# Patient Record
Sex: MALE | URBAN - METROPOLITAN AREA
[De-identification: names, ages, dates, MRNs, and addresses within clinical notes are randomized per-mention and may not be internally consistent; named-entity substitution may affect disease eponyms.]

---

## 2019-08-05 ENCOUNTER — OFFICE VISIT (OUTPATIENT)
Dept: URBAN - METROPOLITAN AREA CLINIC 32 | Facility: CLINIC | Age: 34
End: 2019-08-05
Payer: COMMERCIAL

## 2019-08-05 DIAGNOSIS — H53.40 UNSPECIFIED VISUAL FIELD DEFECTS: Primary | ICD-10-CM

## 2019-08-05 PROCEDURE — 92004 COMPRE OPH EXAM NEW PT 1/>: CPT | Performed by: OPHTHALMOLOGY

## 2019-08-05 ASSESSMENT — INTRAOCULAR PRESSURE
OD: 9
OS: 11

## 2019-08-07 NOTE — IMPRESSION/PLAN
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  Plan: IT VF defect OS -- normal intraocular exam -- will send him for MRI Brain and Orbit with and without contrast -- follow up after results in

## 2019-08-12 ENCOUNTER — OFFICE VISIT (OUTPATIENT)
Dept: URBAN - METROPOLITAN AREA CLINIC 32 | Facility: CLINIC | Age: 34
End: 2019-08-12
Payer: COMMERCIAL

## 2019-08-12 PROCEDURE — 92012 INTRM OPH EXAM EST PATIENT: CPT | Performed by: OPHTHALMOLOGY

## 2019-08-12 NOTE — IMPRESSION/PLAN
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  Plan: Repeat HVF confirms OS VF defect (IT starting from the blind spot, questionable new defect nasal) -- MRI, chem panel, CBC wnl -- now MRA ordered by neurologist -- will try to refer to neuro-ophthalmology -- had conversation with him about getting FA and retina consult but he wants to hold off for now